# Patient Record
Sex: FEMALE | Race: BLACK OR AFRICAN AMERICAN | URBAN - METROPOLITAN AREA
[De-identification: names, ages, dates, MRNs, and addresses within clinical notes are randomized per-mention and may not be internally consistent; named-entity substitution may affect disease eponyms.]

---

## 2021-02-04 ENCOUNTER — NEW PATIENT (OUTPATIENT)
Dept: URBAN - METROPOLITAN AREA CLINIC 10 | Facility: CLINIC | Age: 56
End: 2021-02-04
Payer: COMMERCIAL

## 2021-02-04 DIAGNOSIS — H53.2 DIPLOPIA: Primary | ICD-10-CM

## 2021-02-04 PROCEDURE — 99204 OFFICE O/P NEW MOD 45 MIN: CPT | Performed by: OPTOMETRIST

## 2021-02-04 PROCEDURE — 92060 SENSORIMOTOR EXAMINATION: CPT | Performed by: OPTOMETRIST

## 2021-02-04 PROCEDURE — 92134 CPTRZ OPH DX IMG PST SGM RTA: CPT | Performed by: OPTOMETRIST

## 2021-02-04 ASSESSMENT — INTRAOCULAR PRESSURE
OD: 15
OS: 19

## 2021-02-04 ASSESSMENT — KERATOMETRY
OD: 44.63
OS: 45.13

## 2021-02-04 ASSESSMENT — VISUAL ACUITY
OS: 20/20
OD: 20/20

## 2021-04-20 ENCOUNTER — OFFICE VISIT (OUTPATIENT)
Dept: URBAN - METROPOLITAN AREA CLINIC 10 | Facility: CLINIC | Age: 56
End: 2021-04-20
Payer: COMMERCIAL

## 2021-04-20 PROCEDURE — 99213 OFFICE O/P EST LOW 20 MIN: CPT | Performed by: OPTOMETRIST

## 2021-04-20 NOTE — IMPRESSION/PLAN
Impression: Diplopia ; OU Plan: Intermittent diplopia at distance resolved c prism in Rx. Hx: ((Comitant RHyperT consistent c broken down phoria. Discussed options. Will try prism in Rx. Pt. elects PALs as well.)) RTC 1 year for complete.

## 2022-04-12 ENCOUNTER — OFFICE VISIT (OUTPATIENT)
Dept: URBAN - METROPOLITAN AREA CLINIC 10 | Facility: CLINIC | Age: 57
End: 2022-04-12
Payer: COMMERCIAL

## 2022-04-12 DIAGNOSIS — H04.123 TEAR FILM INSUFFICIENCY OF BILATERAL LACRIMAL GLANDS: Primary | ICD-10-CM

## 2022-04-12 DIAGNOSIS — H53.2 DIPLOPIA: ICD-10-CM

## 2022-04-12 PROCEDURE — 92134 CPTRZ OPH DX IMG PST SGM RTA: CPT | Performed by: OPTOMETRIST

## 2022-04-12 PROCEDURE — 92014 COMPRE OPH EXAM EST PT 1/>: CPT | Performed by: OPTOMETRIST

## 2022-04-12 ASSESSMENT — VISUAL ACUITY
OS: 20/25
OD: 20/25

## 2022-04-12 ASSESSMENT — INTRAOCULAR PRESSURE
OD: 16
OS: 15

## 2022-04-12 NOTE — IMPRESSION/PLAN
Impression: Diplopia ; OU Plan: Continue c current Rx. Pt uses OTC readers for computer which work well. Recommend slightly stronger OTC readers for near.